# Patient Record
Sex: FEMALE | Race: WHITE | URBAN - METROPOLITAN AREA
[De-identification: names, ages, dates, MRNs, and addresses within clinical notes are randomized per-mention and may not be internally consistent; named-entity substitution may affect disease eponyms.]

---

## 2019-11-15 ENCOUNTER — OFFICE VISIT (OUTPATIENT)
Dept: URBAN - METROPOLITAN AREA CLINIC 25 | Facility: CLINIC | Age: 19
End: 2019-11-15
Payer: COMMERCIAL

## 2019-11-15 DIAGNOSIS — H46.9 OPTIC NEURITIS: Primary | ICD-10-CM

## 2019-11-15 PROCEDURE — 92004 COMPRE OPH EXAM NEW PT 1/>: CPT | Performed by: OPTOMETRIST

## 2019-11-15 NOTE — IMPRESSION/PLAN
Impression: Optic neuritis: H46.9. Plan: pt edu on al findings. order MRI of brain and orbits in next 2-3 days. consider neuro consult after results.